# Patient Record
Sex: FEMALE | Race: WHITE | NOT HISPANIC OR LATINO | URBAN - METROPOLITAN AREA
[De-identification: names, ages, dates, MRNs, and addresses within clinical notes are randomized per-mention and may not be internally consistent; named-entity substitution may affect disease eponyms.]

---

## 2017-11-10 ENCOUNTER — IMPORTED ENCOUNTER (OUTPATIENT)
Dept: URBAN - METROPOLITAN AREA CLINIC 9 | Facility: CLINIC | Age: 62
End: 2017-11-10

## 2017-12-12 ENCOUNTER — IMPORTED ENCOUNTER (OUTPATIENT)
Dept: URBAN - METROPOLITAN AREA CLINIC 9 | Facility: CLINIC | Age: 62
End: 2017-12-12

## 2018-06-04 NOTE — PATIENT DISCUSSION
Glasses will not be able to treat all of pt's astigmatism. If pt wants better vision can have Contact lens fit. But would need to see [facility] 1st to have ectropion surgery.

## 2018-07-24 ENCOUNTER — IMPORTED ENCOUNTER (OUTPATIENT)
Dept: URBAN - METROPOLITAN AREA CLINIC 9 | Facility: CLINIC | Age: 63
End: 2018-07-24

## 2018-11-12 ENCOUNTER — IMPORTED ENCOUNTER (OUTPATIENT)
Dept: URBAN - METROPOLITAN AREA CLINIC 9 | Facility: CLINIC | Age: 63
End: 2018-11-12

## 2019-03-19 NOTE — PATIENT DISCUSSION
Recommend Lucentis 0.3 intravitreal Injection. Will do a series of three starting today being 1 of 3.  Will have pt return in 4-5 weeks.

## 2019-03-19 NOTE — PROCEDURE NOTE: CLINICAL
PROCEDURE NOTE: Lucentis 0.3mg OS. Diagnosis: Diabetic Macular Edema. The patient was identified visually and verbally by the  surgeon. The procedure eye was marked with an adhesive arrow sticker. The  risks, benefits, alternatives and possible complications of the procedure  were discussed with the patient and informed consent was obtained. The  patient was positioned in the chair. After numerous topical anesthetic  drops were placed, subconjunctival lidocaine was administered. A speculum  was used to hold the eyelid open. A caliper was used to marked the site of  injection 3.5-4mm from the limbus. Betadine was placed on the site with a  swab and allowed to sit for thirty seconds. A sterile 30 or 31 guage needle with the  medication entered the vitreous cavity and the medication was  administered. The speculum was removed. The eye was copiously rinsed with  saline to remove all betadine, especially from the fornices. Gross vision  was assessed. If the patient wished an antibiotic ointment and eye patch  were applied. The patient was instructed to call immediately if any  significant visual loss, swelling, discharge, or pain occurred Intravitreal injection of Lucentis 0.3mg/0.05 ml was given. Patient tolerated the procedure well. There were no complications. CF vision checked. Post procedure instructions given. Rocio Kelsey

## 2019-04-23 NOTE — PATIENT DISCUSSION
Decreased DME seen on today's exam. Based on today’s exam, diagnostic studies, and/or review of records, the determination was made for treatment, will have pt return for tx only next week.

## 2019-04-23 NOTE — PATIENT DISCUSSION
No conversion to wet seen on today's exam. Discussed in detail re: nature of condition, dry vs wet AMD, AREDS.

## 2019-04-30 NOTE — PATIENT DISCUSSION
Patient here today for treatment only as discussed at last visit.  Decreased DME.  Will continue with injection today.  Patient to return in 1 month for #3/3.

## 2019-04-30 NOTE — PROCEDURE NOTE: CLINICAL
PROCEDURE NOTE: Lucentis 0.5 mg OS. Diagnosis: Diabetic Macular Edema. Anesthesia: Topical. Prep: Betadine Drops and Scrubs. Prior to injection, risks/benefits/alternatives discussed including infection, loss of vision, hemorrhage, cataract, glaucoma, retinal tears or detachment and patient wished to proceed. Informed consent obtained. . Patient was advised the purpose of the treatment was to slow the progression of the disease, and may not improve visual acuity. Betadine prep was performed. Injection site: 3-4 mm from the limbus. Mask worn during procedure. A lid speculum was used. Intravitreal injection of Lucentis 0.5mg/0.05 ml was given. Discarded remaining *. CRA perfusion confirmed. The eye was irrigated with sterile eye rinse solution. The betadine was washed away. Count fingers vision was verified. The patient tolerated the procedure well and there were no complications from the procedure. Post procedure instructions given. Patient given office phone number/answering service number and advised to call immediately should there be an increase in floaters or redness, loss of vision or pain, or should they have any other questions or concerns. Patient was given the standard instruction sheet. Turner Gaxiola

## 2019-05-13 ENCOUNTER — IMPORTED ENCOUNTER (OUTPATIENT)
Dept: URBAN - METROPOLITAN AREA CLINIC 9 | Facility: CLINIC | Age: 64
End: 2019-05-13

## 2019-05-28 NOTE — PROCEDURE NOTE: CLINICAL
PROCEDURE NOTE: Lucentis 0.3mg OS. Diagnosis: Diabetic Macular Edema. The patient was identified visually and verbally by the  surgeon. The procedure eye was marked with an adhesive arrow sticker. The  risks, benefits, alternatives and possible complications of the procedure  were discussed with the patient and informed consent was obtained. The  patient was positioned in the chair. After numerous topical anesthetic  drops were placed, subconjunctival lidocaine was administered. A speculum  was used to hold the eyelid open. A caliper was used to marked the site of  injection 3.5-4mm from the limbus. Betadine was placed on the site with a  swab and allowed to sit for thirty seconds. A sterile 30 or 31 guage needle with the  medication entered the vitreous cavity and the medication was  administered. The speculum was removed. The eye was copiously rinsed with  saline to remove all betadine, especially from the fornices. Gross vision  was assessed. If the patient wished an antibiotic ointment and eye patch  were applied. The patient was instructed to call immediately if any  significant visual loss, swelling, discharge, or pain occurred Intravitreal injection of Lucentis 0.3mg/0.05 ml was given. Patient tolerated the procedure well. There were no complications. CF vision checked. Post procedure instructions given. Boone Rader

## 2019-07-09 NOTE — PROCEDURE NOTE: CLINICAL
PROCEDURE NOTE: Lucentis 0.3mg OS. Diagnosis: Diabetic Macular Edema. The patient was identified visually and verbally by the  surgeon. The procedure eye was marked with an adhesive arrow sticker. The  risks, benefits, alternatives and possible complications of the procedure  were discussed with the patient and informed consent was obtained. The  patient was positioned in the chair. After numerous topical anesthetic  drops were placed, subconjunctival lidocaine was administered. A speculum  was used to hold the eyelid open. A caliper was used to marked the site of  injection 3.5-4mm from the limbus. Betadine was placed on the site with a  swab and allowed to sit for thirty seconds. A sterile 30 or 31 guage needle with the  medication entered the vitreous cavity and the medication was  administered. The speculum was removed. The eye was copiously rinsed with  saline to remove all betadine, especially from the fornices. Gross vision  was assessed. If the patient wished an antibiotic ointment and eye patch  were applied. The patient was instructed to call immediately if any  significant visual loss, swelling, discharge, or pain occurred Intravitreal injection of Lucentis 0.3mg/0.05 ml was given. Patient tolerated the procedure well. There were no complications. CF vision checked. Post procedure instructions given. Turner Gaxiola

## 2019-07-09 NOTE — PATIENT DISCUSSION
Decreased edema, improved. Will treat patient today and have him return in 6 weeks forossible repeat injection.

## 2019-08-08 NOTE — PATIENT DISCUSSION
Recommend right upper lid ptosis repair. Predeterm. Skin/fat to be determined. Skin does not affect MRDs. This will not meet insurance criteria. Skin/fat removal will be cosmetic. (discussed risks and benefits of sx. ..).  OD ONLY skin and fat $1,125.

## 2019-08-20 NOTE — PROCEDURE NOTE: CLINICAL
PROCEDURE NOTE: Lucentis 0.3mg OS. Diagnosis: Diabetic Macular Edema. The patient was identified visually and verbally by the  surgeon. The procedure eye was marked with an adhesive arrow sticker. The  risks, benefits, alternatives and possible complications of the procedure  were discussed with the patient and informed consent was obtained. The  patient was positioned in the chair. After numerous topical anesthetic  drops were placed, subconjunctival lidocaine was administered. A speculum  was used to hold the eyelid open. A caliper was used to marked the site of  injection 3.5-4mm from the limbus. Betadine was placed on the site with a  swab and allowed to sit for thirty seconds. A sterile 30 or 31 guage needle with the  medication entered the vitreous cavity and the medication was  administered. The speculum was removed. The eye was copiously rinsed with  saline to remove all betadine, especially from the fornices. Gross vision  was assessed. If the patient wished an antibiotic ointment and eye patch  were applied. The patient was instructed to call immediately if any  significant visual loss, swelling, discharge, or pain occurred Intravitreal injection of Lucentis 0.3mg/0.05 ml was given. Patient tolerated the procedure well. There were no complications. CF vision checked. Post procedure instructions given. Pearl Cruz

## 2019-08-20 NOTE — PATIENT DISCUSSION
Increased RPE Changes, Worse. No conversion to wet seen on today's exam. Discussed in detail re: nature of condition, dry vs wet AMD, AREDS.

## 2019-08-26 NOTE — PATIENT DISCUSSION
This visual field clearly demonstrated a minimum of 52% loss of upper field of vision OD, with upper lid skin in repose and elevated by taping of the lid to demonstrate potential correction. This field shows that taping the lids significantly improved this patient's superior field of vision by approximately 43%, OD.

## 2019-09-17 NOTE — PROCEDURE NOTE: CLINICAL
PROCEDURE NOTE: Lucentis 0.3mg OS. Diagnosis: Diabetic Macular Edema. The patient was identified visually and verbally by the  surgeon. The procedure eye was marked with an adhesive arrow sticker. The  risks, benefits, alternatives and possible complications of the procedure  were discussed with the patient and informed consent was obtained. The  patient was positioned in the chair. After numerous topical anesthetic  drops were placed, subconjunctival lidocaine was administered. A speculum  was used to hold the eyelid open. A caliper was used to marked the site of  injection 3.5-4mm from the limbus. Betadine was placed on the site with a  swab and allowed to sit for thirty seconds. A sterile 30 or 31 guage needle with the  medication entered the vitreous cavity and the medication was  administered. The speculum was removed. The eye was copiously rinsed with  saline to remove all betadine, especially from the fornices. Gross vision  was assessed. If the patient wished an antibiotic ointment and eye patch  were applied. The patient was instructed to call immediately if any  significant visual loss, swelling, discharge, or pain occurred Intravitreal injection of Lucentis 0.3mg/0.05 ml was given. Patient tolerated the procedure well. There were no complications. CF vision checked. Post procedure instructions given. Attila Holland

## 2019-11-05 NOTE — PROCEDURE NOTE: CLINICAL
PROCEDURE NOTE: Lucentis 0.3mg OS. Diagnosis: Diabetic Macular Edema. The patient was identified visually and verbally by the  surgeon. The procedure eye was marked with an adhesive arrow sticker. The  risks, benefits, alternatives and possible complications of the procedure  were discussed with the patient and informed consent was obtained. The  patient was positioned in the chair. After numerous topical anesthetic  drops were placed, subconjunctival lidocaine was administered. A speculum  was used to hold the eyelid open. A caliper was used to marked the site of  injection 3.5-4mm from the limbus. Betadine was placed on the site with a  swab and allowed to sit for thirty seconds. A sterile 30 or 31 guage needle with the  medication entered the vitreous cavity and the medication was  administered. The speculum was removed. The eye was copiously rinsed with  saline to remove all betadine, especially from the fornices. Gross vision  was assessed. If the patient wished an antibiotic ointment and eye patch  were applied. The patient was instructed to call immediately if any  significant visual loss, swelling, discharge, or pain occurred Intravitreal injection of Lucentis 0.3mg/0.05 ml was given. Patient tolerated the procedure well. There were no complications. CF vision checked. Post procedure instructions given. Nyasia Kitchen

## 2019-11-11 ENCOUNTER — IMPORTED ENCOUNTER (OUTPATIENT)
Dept: URBAN - METROPOLITAN AREA CLINIC 9 | Facility: CLINIC | Age: 64
End: 2019-11-11

## 2019-11-19 NOTE — PROCEDURE NOTE: SURGICAL
<p>PREOPERATIVE DIAGNOSIS: 1. Right upper lid ptosis </p><p>POSTOPERATIVE DIAGNOSIS: Same </p><p>PROCEDURE: 1. Right upper lid ptosis repair. </p><p>SURGEON: Antonella Ugarte MD </p><p>ANESTHESIA: Local MAC. </p><p>ESTIMATED BLOOD LOSS: Minimal &XF;82 cc.</p><p>COMPLICATIONS: None. </p><p>INDICATION: This patient had a droopy righ upper eyelid with good strong levator function indicating this patient had a levator dehiscence. This ptotic lid had decreased the superior and lateral visual field causing the patient decrease visual function. We have discussed the benefits of raising the eyelid to improve visual function in this patient. We have, therefore, decided to proceed with an external ptosis repair of the right upper eyelid. The patient is aware of the risks and benefits and wishes to proceed. </p><p>PROCEDURE: Prior to surgery, a time-out was performed in the pre-operative area. The team, consisting of the surgeon, nurse anesthetist, pre-op nurse and circulating nurse, were gathered around the patient. I confirmed the patient&rsquo;s&nbsp;identity&nbsp;and&nbsp;name,&nbsp;the&nbsp;side&nbsp;and&nbsp;site&nbsp;of&nbsp;the&nbsp;surgery&nbsp;and&nbsp;the&nbsp;type&nbsp;of&nbsp;surgery&nbsp;and&nbsp;procedure&nbsp;to&nbsp;be performed. After confirming this with the patient, in this time-out fashion, I proceeded with the marking of the patient with a blue marking pen, in the areas of surgery to be performed. After informed consent was obtained, the patient was taken to the operating room and placed in the supine position. A drop of Alcaine was placed in each eye and then the patient received monitored anesthesia care. Pre-operative&nbsp;markings&nbsp;corresponding&nbsp;to&nbsp;the&nbsp;patient's limbus on the right upper lid and lid crease were drawn using Methylene Blue marker. Xylocaine 2% with epinephrine was then used to inject into the upper lid and the patient was prepped and draped in the typical sterile fashion as per ophthalmic plastic surgery. A Bovie cautery was used to make an incision in the pre-marked horizontal line of the right upper lid. This incision line was 1 cm and placed just below the lid crease. Dissection was then carried down to the tarsal plate. A rake was used to pull the lid downward exposing tarsal plate with dissection carried out with the Bovie cautery removing tissue off the tarsal plate, the area was then dissected superiorly with removal of orbicularis muscle. The edge of levator tendon was identified as a white tendon and this was a sutured with a double armed 5-0 vicryl suture and then sutured with a lamellar bite down to the tarsal plate. The levator was advanced 3 mm down into the tarsal plate and tied. Care was taken to make sure this suture was not fullthickness; the lid was everted without any signs of the suture on either side. The wound was then closed in the skin with 6-0 plain suture. The patient was awakened and found to have excellent curve and height to the lid. The patient had ophthalmic antibiotic ointment placed on the wounds. The patient tolerated the procedure well and was sent to the recovery room in stable condition. </p>

## 2019-12-10 NOTE — PROCEDURE NOTE: CLINICAL
PROCEDURE NOTE: Lucentis 0.3mg OS. Diagnosis: Diabetic Macular Edema. The patient was identified visually and verbally by the  surgeon. The procedure eye was marked with an adhesive arrow sticker. The  risks, benefits, alternatives and possible complications of the procedure  were discussed with the patient and informed consent was obtained. The  patient was positioned in the chair. After numerous topical anesthetic  drops were placed, subconjunctival lidocaine was administered. A speculum  was used to hold the eyelid open. A caliper was used to marked the site of  injection 3.5-4mm from the limbus. Betadine was placed on the site with a  swab and allowed to sit for thirty seconds. A sterile 30 or 31 guage needle with the  medication entered the vitreous cavity and the medication was  administered. The speculum was removed. The eye was copiously rinsed with  saline to remove all betadine, especially from the fornices. Gross vision  was assessed. If the patient wished an antibiotic ointment and eye patch  were applied. The patient was instructed to call immediately if any  significant visual loss, swelling, discharge, or pain occurred Intravitreal injection of Lucentis 0.3mg/0.05 ml was given. Patient tolerated the procedure well. There were no complications. CF vision checked. Post procedure instructions given. Jf Salazar

## 2020-01-21 NOTE — PROCEDURE NOTE: CLINICAL
PROCEDURE NOTE: Lucentis 0.3mg OS. Diagnosis: Diabetic Macular Edema. The patient was identified visually and verbally by the  surgeon. The procedure eye was marked with an adhesive arrow sticker. The  risks, benefits, alternatives and possible complications of the procedure  were discussed with the patient and informed consent was obtained. The  patient was positioned in the chair. After numerous topical anesthetic  drops were placed, subconjunctival lidocaine was administered. A speculum  was used to hold the eyelid open. A caliper was used to marked the site of  injection 3.5-4mm from the limbus. Betadine was placed on the site with a  swab and allowed to sit for thirty seconds. A sterile 30 or 31 guage needle with the  medication entered the vitreous cavity and the medication was  administered. The speculum was removed. The eye was copiously rinsed with  saline to remove all betadine, especially from the fornices. Gross vision  was assessed. If the patient wished an antibiotic ointment and eye patch  were applied. The patient was instructed to call immediately if any  significant visual loss, swelling, discharge, or pain occurred Intravitreal injection of Lucentis 0.3mg/0.05 ml was given. Patient tolerated the procedure well. There were no complications. CF vision checked. Post procedure instructions given. Cheryl Campbell

## 2020-07-07 NOTE — PROCEDURE NOTE: CLINICAL
PROCEDURE NOTE: Lucentis 0.3mg OS. Diagnosis: Diabetic Macular Edema. The patient was identified visually and verbally by the  surgeon. The procedure eye was marked with an adhesive arrow sticker. The  risks, benefits, alternatives and possible complications of the procedure  were discussed with the patient and informed consent was obtained. The  patient was positioned in the chair. After numerous topical anesthetic  drops were placed, subconjunctival lidocaine was administered. A speculum  was used to hold the eyelid open. A caliper was used to marked the site of  injection 3.5-4mm from the limbus. Betadine was placed on the site with a  swab and allowed to sit for thirty seconds. A sterile 30 or 31 guage needle with the  medication entered the vitreous cavity and the medication was  administered. The speculum was removed. The eye was copiously rinsed with  saline to remove all betadine, especially from the fornices. Gross vision  was assessed. If the patient wished an antibiotic ointment and eye patch  were applied. The patient was instructed to call immediately if any  significant visual loss, swelling, discharge, or pain occurred Intravitreal injection of Lucentis 0.3mg/0.05 ml was given. Patient tolerated the procedure well. There were no complications. CF vision checked. Post procedure instructions given. Alejandra Beyer

## 2020-08-04 NOTE — PROCEDURE NOTE: CLINICAL
PROCEDURE NOTE: Lucentis 0.3mg OS. Diagnosis: Diabetic Macular Edema. The patient was identified visually and verbally by the  surgeon. The procedure eye was marked with an adhesive arrow sticker. The  risks, benefits, alternatives and possible complications of the procedure  were discussed with the patient and informed consent was obtained. The  patient was positioned in the chair. After numerous topical anesthetic  drops were placed, subconjunctival lidocaine was administered. A speculum  was used to hold the eyelid open. A caliper was used to marked the site of  injection 3.5-4mm from the limbus. Betadine was placed on the site with a  swab and allowed to sit for thirty seconds. A sterile 30 or 31 guage needle with the  medication entered the vitreous cavity and the medication was  administered. The speculum was removed. The eye was copiously rinsed with  saline to remove all betadine, especially from the fornices. Gross vision  was assessed. If the patient wished an antibiotic ointment and eye patch  were applied. The patient was instructed to call immediately if any  significant visual loss, swelling, discharge, or pain occurred Intravitreal injection of Lucentis 0.3mg/0.05 ml was given. Patient tolerated the procedure well. There were no complications. CF vision checked. Post procedure instructions given. Mali Serrano

## 2020-09-08 NOTE — PROCEDURE NOTE: CLINICAL
PROCEDURE NOTE: Eylea Sample OS. Diagnosis: Diabetic Macular Edema. Anesthesia: Topical. Prep: Betadine Drops and Betadine Scrub. Prior to injection, risks/benefits/alternatives discussed including corneal abrasion, infection, loss of vision, hemorrhage, cataract, glaucoma, retinal tears or detachment. A written consent is on file, and the need for today’s injection was discussed and the patient is understanding and wishes to proceed. The entire vial of Eylea was drawn up into a syringe. The opened vial, remaining drug, and filtered needle were disposed of in a certified biohazard container. Betadine prep was performed. Topical anesthesia was induced with Alcaine. 4% lidocaine pledge. A lid speculum was used. A short 30g needle on a 1cc syringe was used with product that that had previously been prepared under sterile conditions. Injection site: 3-4 mm from the limbus. The used syringe/needle was transferred to a biohazard container. Lid speculum removed. Mask worn during procedure. Patient tolerated procedure well. Count fingers vision was verified. There were no complications. Patient was given the standard instruction sheet. Patient given office phone number/answering service number and advised to call immediately should there be loss of vision or pain, or should they have any other questions or concerns. Sudha Dominguez

## 2020-11-03 NOTE — PROCEDURE NOTE: CLINICAL
PROCEDURE NOTE: Eylea Prefilled Syringe 2mg OS. Diagnosis: Diabetic Macular Edema. Prep: Betadine Drops and Betadine Scrub. Prior to injection, risks/benefits/alternatives discussed including corneal abrasion, infection, loss of vision, hemorrhage, cataract, glaucoma, retinal tears or detachment. A written consent is on file, and the need for today's injection was discussed and the patient is understanding and wishes to proceed. A 30G needle was placed on an Eylea 2mg/0.05ml Pre-filled Syringe. Betadine prep was performed. Topical anesthesia was induced with Alcaine. 4% lidocaine pledge. A lid speculum was used. An intravitreal injection of Eylea was given. Injection site: 3-4 mm from the limbus. The used syringe/needle was transferred to a biohazard container. Lid speculum removed. Mask worn during procedure. Patient tolerated procedure well. Count fingers vision was verified. There were no complications. Patient was given the standard instruction sheet. Alejandra Beyer

## 2020-11-03 NOTE — PATIENT DISCUSSION
Stable, Discussed with the patient the importance of good control of their blood sugar, blood pressure, cholesterol, diet, exercise, weight, and medication usage under the guidance of their diabetic doctor to prevent/halt diabetic retinopathy.

## 2020-11-09 ENCOUNTER — IMPORTED ENCOUNTER (OUTPATIENT)
Dept: URBAN - METROPOLITAN AREA CLINIC 9 | Facility: CLINIC | Age: 65
End: 2020-11-09

## 2020-12-08 NOTE — PROCEDURE NOTE: CLINICAL
PROCEDURE NOTE: Eylea Prefilled Syringe 2mg OS. Diagnosis: Diabetic Macular Edema. Prep: Betadine Drops and Betadine Scrub. Prior to injection, risks/benefits/alternatives discussed including corneal abrasion, infection, loss of vision, hemorrhage, cataract, glaucoma, retinal tears or detachment. A written consent is on file, and the need for today's injection was discussed and the patient is understanding and wishes to proceed. A 30G needle was placed on an Eylea 2mg/0.05ml Pre-filled Syringe. Betadine prep was performed. Topical anesthesia was induced with Alcaine. 4% lidocaine pledge. A lid speculum was used. An intravitreal injection of Eylea was given. Injection site: 3-4 mm from the limbus. The used syringe/needle was transferred to a biohazard container. Lid speculum removed. Mask worn during procedure. Patient tolerated procedure well. Count fingers vision was verified. There were no complications. Patient was given the standard instruction sheet. Erendira Shahid

## 2021-03-02 NOTE — PROCEDURE NOTE: CLINICAL
PROCEDURE NOTE: Eylea Prefilled Syringe 2mg OS. Diagnosis: Diabetic Macular Edema. Prep: Betadine Drops and Betadine Scrub. Prior to injection, risks/benefits/alternatives discussed including corneal abrasion, infection, loss of vision, hemorrhage, cataract, glaucoma, retinal tears or detachment. A written consent is on file, and the need for today's injection was discussed and the patient is understanding and wishes to proceed. A 30G needle was placed on an Eylea 2mg/0.05ml Pre-filled Syringe. Betadine prep was performed. Topical anesthesia was induced with Alcaine. 4% lidocaine pledge. A lid speculum was used. An intravitreal injection of Eylea was given. Injection site: 3-4 mm from the limbus. The used syringe/needle was transferred to a biohazard container. Lid speculum removed. Mask worn during procedure. Patient tolerated procedure well. Count fingers vision was verified. There were no complications. Patient was given the standard instruction sheet. Nyasia Kitchen

## 2021-03-30 NOTE — PROCEDURE NOTE: CLINICAL
PROCEDURE NOTE: Eylea Prefilled Syringe 2mg OS. Diagnosis: Diabetic Macular Edema. Prep: Betadine Drops and Betadine Scrub. Prior to injection, risks/benefits/alternatives discussed including corneal abrasion, infection, loss of vision, hemorrhage, cataract, glaucoma, retinal tears or detachment. A written consent is on file, and the need for today's injection was discussed and the patient is understanding and wishes to proceed. A 30G needle was placed on an Eylea 2mg/0.05ml Pre-filled Syringe. Betadine prep was performed. Topical anesthesia was induced with Alcaine. 4% lidocaine pledge. A lid speculum was used. An intravitreal injection of Eylea was given. Injection site: 3-4 mm from the limbus. The used syringe/needle was transferred to a biohazard container. Lid speculum removed. Mask worn during procedure. Patient tolerated procedure well. Count fingers vision was verified. There were no complications. Patient was given the standard instruction sheet. Jf Salazar

## 2021-04-27 NOTE — PROCEDURE NOTE: CLINICAL
PROCEDURE NOTE: Eylea Prefilled Syringe 2mg OS. Diagnosis: Diabetic Macular Edema. Prep: Betadine Drops and Betadine Scrub. Prior to injection, risks/benefits/alternatives discussed including corneal abrasion, infection, loss of vision, hemorrhage, cataract, glaucoma, retinal tears or detachment. A written consent is on file, and the need for today's injection was discussed and the patient is understanding and wishes to proceed. A 30G needle was placed on an Eylea 2mg/0.05ml Pre-filled Syringe. Betadine prep was performed. Topical anesthesia was induced with Alcaine. 4% lidocaine pledge. A lid speculum was used. An intravitreal injection of Eylea was given. Injection site: 3-4 mm from the limbus. The used syringe/needle was transferred to a biohazard container. Lid speculum removed. Mask worn during procedure. Patient tolerated procedure well. Count fingers vision was verified. There were no complications. Patient was given the standard instruction sheet. Rolanda Al

## 2021-05-05 ENCOUNTER — PREPPED CHART (OUTPATIENT)
Dept: URBAN - METROPOLITAN AREA CLINIC 17 | Facility: CLINIC | Age: 66
End: 2021-05-05

## 2021-05-05 ENCOUNTER — IMPORTED ENCOUNTER (OUTPATIENT)
Dept: URBAN - METROPOLITAN AREA CLINIC 9 | Facility: CLINIC | Age: 66
End: 2021-05-05

## 2021-05-05 PROBLEM — H04.123: Noted: 2021-05-05

## 2021-05-05 PROBLEM — H52.4: Noted: 2021-05-05

## 2021-05-25 NOTE — PROCEDURE NOTE: CLINICAL
PROCEDURE NOTE: Eylea Prefilled Syringe 2mg OS. Diagnosis: Diabetic Macular Edema. Prep: Betadine Drops and Betadine Scrub. Prior to injection, risks/benefits/alternatives discussed including corneal abrasion, infection, loss of vision, hemorrhage, cataract, glaucoma, retinal tears or detachment. A written consent is on file, and the need for today's injection was discussed and the patient is understanding and wishes to proceed. A 30G needle was placed on an Eylea 2mg/0.05ml Pre-filled Syringe. Betadine prep was performed. Topical anesthesia was induced with Alcaine. 4% lidocaine pledge. A lid speculum was used. An intravitreal injection of Eylea was given. Injection site: 3-4 mm from the limbus. The used syringe/needle was transferred to a biohazard container. Lid speculum removed. Mask worn during procedure. Patient tolerated procedure well. Count fingers vision was verified. There were no complications. Patient was given the standard instruction sheet. Cheryl Campbell

## 2021-08-19 NOTE — PATIENT DISCUSSION
Discussed the importance of blood pressure control in the prevention of ocular complications. negative

## 2021-08-19 NOTE — PATIENT DISCUSSION
Symptoms of retinal detachment and endophthalmitis following intravitreal injection discussed; patient advised to call immediately if symptoms ensue. yes...

## 2021-09-20 NOTE — PATIENT DISCUSSION
PROGRESS NOTE      Elaina Baires Patient Status:  Inpatient    1956 MRN [id number]   Location [address] SURG Attending Joan Wilcox MD   Hosp Day # 6 PCP Lakesha Dominguez MD       Subjective    Constipated  Became very tearful, feeling overwhelmed by the chronicity of her current medical condition/illness involving the shoulder    Objective      Intake/Output Summary (Last 24 hours) at 2021 1201  Last data filed at 2021 1900  Gross per 24 hour   Intake 400 ml   Output --   Net 400 ml        Last Recorded Vitals  Blood pressure 134/75, pulse 64, temperature 98.6 °F (37 °C), temperature source Oral, resp. rate 16, height 5' 2.5\" (1.588 m), weight 74 kg (163 lb 2.3 oz), SpO2 99 %.  Body mass index is 29.36 kg/m².    Physical Exam  Vitals reviewed.   HENT:      Head: Normocephalic and atraumatic.   Eyes:      Pupils: Pupils are equal, round, and reactive to light.   Neck:      Trachea: No tracheal deviation.   Cardiovascular:      Rate and Rhythm: Normal rate and regular rhythm.   Pulmonary:      Effort: Pulmonary effort is normal. No respiratory distress.      Breath sounds: Normal breath sounds. No stridor. No wheezing or rales.   Chest:      Chest wall: No tenderness.   Abdominal:      General: There is no distension.      Palpations: Abdomen is soft.      Tenderness: There is no abdominal tenderness. There is no guarding or rebound.   Musculoskeletal:         General: No tenderness or deformity.      Cervical back: Neck supple.      Comments: Right shoulder in cryo pack  Distally neurovascularly intact in rue    Skin:     General: Skin is warm and dry.      Findings: No erythema or rash.   Neurological:      Mental Status: She is alert and oriented to person, place, and time.   Psychiatric:         Mood and Affect: Mood and affect normal.         Cognition and Memory: Memory normal.         Judgment: Judgment normal.          Current Facility-Administered Medications  (part of systemic allergic issues.  Seeing Allergist at this time).   Medication Dose Route Frequency Provider Last Rate Last Admin   • bisacodyl (DULCOLAX) suppository 10 mg  10 mg Rectal Once Joan Wilcox MD       • diphenhydrAMINE (BENADRYL) capsule 25 mg  25 mg Oral Q6H PRN Joan Wilcox MD   25 mg at 09/20/21 0521   • HYDROcodone-acetaminophen (NORCO) 5-325 MG per tablet 2 tablet  2 tablet Oral Q4H PRN Aaron Merchant DO   2 tablet at 09/20/21 0944   • HYDROmorphone (DILAUDID) injection 0.6 mg  0.6 mg Intravenous Q6H PRN Joan Wilcox MD   0.6 mg at 09/20/21 0356   • VANCOMYCIN - PHARMACIST MONITORED Misc   Does not apply See Admin Instructions Chetan Yang MD       • acetaminophen (TYLENOL) tablet 500 mg  500 mg Oral Q6H PRN Chetan Yang MD       • albuterol inhaler 2 puff  2 puff Inhalation Q6H Resp Chetan Yang MD   2 puff at 09/20/21 1001   • amLODIPine (NORVASC) tablet 10 mg  10 mg Oral Daily Chetan Yang MD   10 mg at 09/20/21 0930   • atorvastatin (LIPITOR) tablet 10 mg  10 mg Oral Nightly Chetan Yang MD   10 mg at 09/19/21 2122   • lisinopril (ZESTRIL) tablet 40 mg  40 mg Oral Daily Chetan Yang MD   40 mg at 09/20/21 0930   • enoxaparin (LOVENOX) injection 40 mg  40 mg Subcutaneous Q Evening Chetan Yang MD   40 mg at 09/19/21 1800   • famotidine (PEPCID) tablet 20 mg  20 mg Oral QHS Chetan Yang MD   20 mg at 09/19/21 2122   • aluminum-magnesium hydroxide-simethicone (MAALOX) 200-200-20 MG/5ML suspension 20 mL  20 mL Oral Q6H PRN Chetan Yang MD       • melatonin tablet 3 mg  3 mg Oral QHS PRN Chetan Yang MD   3 mg at 09/17/21 2108   • ondansetron (ZOFRAN) injection 4 mg  4 mg Intravenous Q8H PRN Chetan Yang MD   4 mg at 09/18/21 2248   • insulin lispro (ADMELOG,HumaLOG) - Correction Dose   Subcutaneous TID WC Chetan Yang MD   2 Units at 09/18/21 0800   • insulin lispro (ADMELOG,HumaLOG) - Correction Dose   Subcutaneous Nightly Chetan Yang MD       • dextrose 50 % injection 25 g  25 g Intravenous PRN Chetan CONSTANTINO  MD Trey       • dextrose 50 % injection 12.5 g  12.5 g Intravenous PRN Chetan Yang MD       • glucagon (GLUCAGEN) injection 1 mg  1 mg Intramuscular PRN Chetan Yang MD       • dextrose (GLUTOSE) 40 % gel 15 g  15 g Oral PRN Chetan Yang MD       • dextrose (GLUTOSE) 40 % gel 30 g  30 g Oral PRN Chetan Yang MD       • vancomycin (VANCOCIN) 1,000 mg in sodium chloride 0.9 % 250 mL IVPB  1,000 mg Intravenous 2 times per day Chetan Yang .7 mL/hr at 09/20/21 0931 1,000 mg at 09/20/21 0931   • acetaminophen (TYLENOL) tablet 1,000 mg  1,000 mg Oral Once Mariam Douglas MD             Labs     Recent Results (from the past 24 hour(s))   GLUCOSE, BEDSIDE - POINT OF CARE    Collection Time: 09/19/21  3:38 PM   Result Value Ref Range    GLUCOSE, BEDSIDE - POINT OF CARE 157 (H) 70 - 99 mg/dL   GLUCOSE, BEDSIDE - POINT OF CARE    Collection Time: 09/19/21  8:44 PM   Result Value Ref Range    GLUCOSE, BEDSIDE - POINT OF CARE 195 (H) 70 - 99 mg/dL   Creatinine    Collection Time: 09/20/21  5:16 AM   Result Value Ref Range    Creatinine 0.80 0.51 - 0.95 mg/dL    Glomerular Filtration Rate 90 >=60   GLUCOSE, BEDSIDE - POINT OF CARE    Collection Time: 09/20/21  5:23 AM   Result Value Ref Range    GLUCOSE, BEDSIDE - POINT OF CARE 133 (H) 70 - 99 mg/dL   GLUCOSE, BEDSIDE - POINT OF CARE    Collection Time: 09/20/21 11:16 AM   Result Value Ref Range    GLUCOSE, BEDSIDE - POINT OF CARE 217 (H) 70 - 99 mg/dL        Imaging  XR CHEST PA AND LATERAL 2 VIEWS    Result Date: 9/14/2021  Narrative: EXAM: Chest PA lateral INDICATION arm pain after recent PICC line removal, dyspnea Comparing x-ray from 05/02/2021     Impression: FINDINGS with IMPRESSION:  No consolidation or pleural effusion.  Slight scarring at left base.  Heart size and pulmonary vessel caliber normal.  Aortic atherosclerotic calcification.  Surgical anchors in the right humeral head.  Surgical staples in the upper abdomen. Electronically Signed by:  ASHWIN QUINTEROS M.D. Signed on: 9/14/2021 6:04 AM     XR SHOULDER 3 VIEWS RIGHT    Result Date: 9/14/2021  Narrative: EXAM: XR SHOULDER 3 VIEWS RIGHT CLINICAL INDICATION: 64-year-old female with worsening right shoulder pain. COMPARISON: 07/21/2021 FINDINGS:  There are postsurgical changes in the proximal right humerus with two orthopedic anchors.  Lucency surrounding the orthopedic anchors is similar to prior exam as well as the irregularity in the superolateral humeral head and erosive change in the medial inferior humerus.  No fracture or dislocation is seen.  Joint spaces are preserved.  The visualized soft tissues are grossly unremarkable.     Impression:  No significant change in the appearance of the right shoulder with stable lucency/erosive changes throughout the right humeral head. Radiographically stable lucencies/erosive change may sequela of osteomyelitis/septic arthritis which was seen on prior July 2021 at admission.  Acute on chronic pathology cannot be excluded radiographically. FOR PHYSICIAN USE ONLY - Please note that this report was generated using voice recognition software.  If you require clarification or feel that there has been an error in this report please contact me through [employer]..  Thank you very much for allowing me to participate in the care of your patient. Electronically Signed by: LISA DAMON M.D. Signed on: 9/14/2021 9:35 AM     US VAS UPPER EXTREMITY VENOUS DUPLEX RIGHT    Result Date: 9/14/2021  Narrative: EXAM: US VASC UPPER EXTREMITY VENOUS DUPLEX RIGHT CLINICAL INDICATION: 64-year-old female with right arm pain and swelling, recent PICC line. COMPARISON: 10/22/2020. FINDINGS: The right internal jugular, visualized patent, axillary, basilic, cephalic, and brachial veins demonstrate compressibility with spontaneous color flow and normal respiratory variation which is consistent with patency. Left internal jugular vein was imaged for comparison is patent. No fluid  collections are identified.     Impression: No evidence of deep vein thrombosis in the right upper extremity. A preliminary report was provided by the on-call teleradiology service. The FOR PHYSICIAN USE ONLY - Please note that this report was generated using voice recognition software.  If you require clarification or feel that there has been an error in this report please contact me through [employer].  Thank you very much for allowing me to participate in the care of your patient. Electronically Signed by: LISA DAMON M.D. Signed on: 9/14/2021 8:03 AM          Assessment & Plan  Subacute Osteomyelitis of Right Shoulder (Cms/Hcc)   Staphylococcal Arthritis of Right Shoulder (Cms/Hcc)   Septic Arthritis of Shoulder, Right (Cms/Hcc)   Adhesive Capsulitis of Right Shoulder      Abscess of axillary region MSSA   Hx Rotator Cuff Tear s/p rt shoulder arthroplasty    Ortho ff s/p debridement on 9/17  Will eventually need a replacement once infection resolved   ID on consult.  PICC line today and plan for home with 6 weeks of IV vancomycin. Premedicate with benadryl, ctm closely   On pain management  Physical Therapy eval       Hyponatremia   Resolved        Type 2 Diabetes Mellitus With Hyperglycemia (Cms/Hcc)   On Accuchecks and ISS      Anemia of Chronic Disease   No signs of bleeding      History of Cva (Cerebrovascular Accident) Without Residual Deficits  On Neurochecks      Copd (Chronic Obstructive Pulmonary Disease) (Cms/Hcc)  Inhalers prn      Debility  Djd (Degenerative Joint Disease)  On Fall precautions          DVT Prophylaxis  Current Active Medications for DVT Prophylaxis (From admission, onward)         Stop     enoxaparin (LOVENOX) injection 40 mg  40 mg,   Subcutaneous,   EVERY EVENING         --                  Joan Wilcox MD  9/20/2021      This note was generated in part using \"Dragon\" voice recognition technology. The report was reviewed by this physician, but still may have unintentional  errors due to inherent limitations of voice recognition technology.

## 2021-10-05 NOTE — PROCEDURE NOTE: CLINICAL
PROCEDURE NOTE: Eylea Prefilled Syringe 2mg OS. Diagnosis: Diabetic Macular Edema. Prep: Betadine Drops and Betadine Scrub. Prior to injection, risks/benefits/alternatives discussed including corneal abrasion, infection, loss of vision, hemorrhage, cataract, glaucoma, retinal tears or detachment. A written consent is on file, and the need for today's injection was discussed and the patient is understanding and wishes to proceed. A 30G needle was placed on an Eylea 2mg/0.05ml Pre-filled Syringe. Betadine prep was performed. Topical anesthesia was induced with Alcaine. 4% lidocaine pledge. A lid speculum was used. An intravitreal injection of Eylea was given. Injection site: 3-4 mm from the limbus. The used syringe/needle was transferred to a biohazard container. Lid speculum removed. Mask worn during procedure. Patient tolerated procedure well. Count fingers vision was verified. There were no complications. Patient was given the standard instruction sheet. Jonathan Cotton

## 2021-10-16 ASSESSMENT — VISUAL ACUITY
OS_CC: 20/20 SN
OS_SC: 20/20 SN
OS_SC: 20/20 -2 SN
OS_CC: 20/20 SN
OD_CC: 20/20 - SN
OD_SC: 20/20 SN
OS_SC: 20/20 - SN
OD_SC: 20/25 - SN
OD_SC: 20/20 -2 SN
OS_CC: 20/20 SN
OD_CC: 20/20 SN
OD_SC: 20/20 -2 SN
OS_SC: 20/20 - SN
OD_SC: 20/25 SN
OD_SC: 20/25 + SN
OD_CC: 20/20 SN
OS_SC: 20/20 SN
OD_SC: 20/20 SN
OS_SC: 20/25 SN
OD_CC: 20/20 SN
OS_CC: 20/20 SN
OS_SC: 20/20 - SN

## 2021-10-16 ASSESSMENT — TONOMETRY
OD_IOP_MMHG: 20
OS_IOP_MMHG: 16
OS_IOP_MMHG: 17
OS_IOP_MMHG: 18
OS_IOP_MMHG: 18
OD_IOP_MMHG: 17
OD_IOP_MMHG: 18
OS_IOP_MMHG: 19
OD_IOP_MMHG: 19
OD_IOP_MMHG: 17
OD_IOP_MMHG: 20
OD_IOP_MMHG: 20
OD_IOP_MMHG: 19
OS_IOP_MMHG: 16

## 2021-11-04 ENCOUNTER — ESTABLISHED PATIENT (OUTPATIENT)
Dept: URBAN - METROPOLITAN AREA CLINIC 12 | Facility: CLINIC | Age: 66
End: 2021-11-04

## 2021-11-04 DIAGNOSIS — H25.13: ICD-10-CM

## 2021-11-04 DIAGNOSIS — H52.4: ICD-10-CM

## 2021-11-04 PROCEDURE — 99199PERE PHYSICIANS EYECARE PLAN EXAM AND REFRACT EST PT

## 2021-11-04 PROCEDURE — 92015 DETERMINE REFRACTIVE STATE: CPT

## 2021-11-04 ASSESSMENT — TONOMETRY
OD_IOP_MMHG: 12
OS_IOP_MMHG: 12

## 2021-11-04 ASSESSMENT — VISUAL ACUITY
OD_SC: 20/25-2
OS_SC: 20/30+1

## 2022-01-21 NOTE — PROCEDURE NOTE: CLINICAL
PROCEDURE NOTE: Eylea Prefilled Syringe 2mg OS. Diagnosis: Diabetic Macular Edema. Prep: Betadine Drops and Betadine Scrub. Prior to injection, risks/benefits/alternatives discussed including corneal abrasion, infection, loss of vision, hemorrhage, cataract, glaucoma, retinal tears or detachment. A written consent is on file, and the need for today's injection was discussed and the patient is understanding and wishes to proceed. A 30G needle was placed on an Eylea 2mg/0.05ml Pre-filled Syringe. Betadine prep was performed. Topical anesthesia was induced with Alcaine. 4% lidocaine pledge. A lid speculum was used. An intravitreal injection of Eylea was given. Injection site: 3-4 mm from the limbus. The used syringe/needle was transferred to a biohazard container. Lid speculum removed. Mask worn during procedure. Patient tolerated procedure well. Count fingers vision was verified. There were no complications. Patient was given the standard instruction sheet. Loretta Yip
rib pain/injury
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
## 2022-01-25 NOTE — PROCEDURE NOTE: CLINICAL
PROCEDURE NOTE: Eylea Prefilled Syringe 2mg OS. Diagnosis: Diabetic Macular Edema. Prep: Betadine Drops and Betadine Scrub. Prior to injection, risks/benefits/alternatives discussed including corneal abrasion, infection, loss of vision, hemorrhage, cataract, glaucoma, retinal tears or detachment. A written consent is on file, and the need for today's injection was discussed and the patient is understanding and wishes to proceed. A 30G needle was placed on an Eylea 2mg/0.05ml Pre-filled Syringe. Betadine prep was performed. Topical anesthesia was induced with Alcaine. 4% lidocaine pledge. A lid speculum was used. An intravitreal injection of Eylea was given. Injection site: 3-4 mm from the limbus. The used syringe/needle was transferred to a biohazard container. Lid speculum removed. Mask worn during procedure. Patient tolerated procedure well. Count fingers vision was verified. There were no complications. Patient was given the standard instruction sheet. Pam Klein

## 2022-03-22 NOTE — PROCEDURE NOTE: CLINICAL
PROCEDURE NOTE: Eylea Prefilled Syringe 2mg OS. Diagnosis: Diabetic Macular Edema. Prep: Betadine Drops and Betadine Scrub. Prior to injection, risks/benefits/alternatives discussed including corneal abrasion, infection, loss of vision, hemorrhage, cataract, glaucoma, retinal tears or detachment. A written consent is on file, and the need for today's injection was discussed and the patient is understanding and wishes to proceed. A 30G needle was placed on an Eylea 2mg/0.05ml Pre-filled Syringe. Betadine prep was performed. Topical anesthesia was induced with Alcaine. 4% lidocaine pledge. A lid speculum was used. An intravitreal injection of Eylea was given. Injection site: 3-4 mm from the limbus. The used syringe/needle was transferred to a biohazard container. Lid speculum removed. Mask worn during procedure. Patient tolerated procedure well. Count fingers vision was verified. There were no complications. Patient was given the standard instruction sheet. Gumaro Polk

## 2022-05-17 NOTE — PROCEDURE NOTE: CLINICAL
PROCEDURE NOTE: Eylea Prefilled Syringe 2mg OS. Diagnosis: Diabetic Macular Edema. Prep: Betadine Drops and Betadine Scrub. Prior to injection, risks/benefits/alternatives discussed including corneal abrasion, infection, loss of vision, hemorrhage, cataract, glaucoma, retinal tears or detachment. A written consent is on file, and the need for today's injection was discussed and the patient is understanding and wishes to proceed. A 30G needle was placed on an Eylea 2mg/0.05ml Pre-filled Syringe. Betadine prep was performed. Topical anesthesia was induced with Alcaine. 4% lidocaine pledge. A lid speculum was used. An intravitreal injection of Eylea was given. Injection site: 3-4 mm from the limbus. The used syringe/needle was transferred to a biohazard container. Lid speculum removed. Mask worn during procedure. Patient tolerated procedure well. Count fingers vision was verified. There were no complications. Patient was given the standard instruction sheet. Rocio Pollen

## 2022-05-17 NOTE — PATIENT DISCUSSION
An examination that was significantly and separately identifiable from the procedure performed today was also completed for Ocular Pain.

## 2022-06-24 RX ORDER — ATORVASTATIN CALCIUM 40 MG/1
TABLET, FILM COATED ORAL
COMMUNITY
End: 2022-09-16 | Stop reason: SDUPTHER

## 2022-06-24 RX ORDER — CITALOPRAM 40 MG/1
TABLET ORAL
COMMUNITY
End: 2022-09-16 | Stop reason: SDUPTHER

## 2022-06-24 RX ORDER — BUPROPION HYDROCHLORIDE 300 MG/1
TABLET ORAL
COMMUNITY
End: 2022-09-16 | Stop reason: SDUPTHER

## 2022-07-12 NOTE — PROCEDURE NOTE: CLINICAL
PROCEDURE NOTE: Eylea Prefilled Syringe 2mg OS. Diagnosis: Diabetic Macular Edema. Prep: Betadine Drops and Betadine Scrub. Prior to injection, risks/benefits/alternatives discussed including corneal abrasion, infection, loss of vision, hemorrhage, cataract, glaucoma, retinal tears or detachment. A written consent is on file, and the need for today's injection was discussed and the patient is understanding and wishes to proceed. A 30G needle was placed on an Eylea 2mg/0.05ml Pre-filled Syringe. Betadine prep was performed. Topical anesthesia was induced with Alcaine. 4% lidocaine pledge. A lid speculum was used. An intravitreal injection of Eylea was given. Injection site: 3-4 mm from the limbus. The used syringe/needle was transferred to a biohazard container. Lid speculum removed. Mask worn during procedure. Patient tolerated procedure well. Count fingers vision was verified. There were no complications. Patient was given the standard instruction sheet. Boone Rader

## 2022-08-16 NOTE — PROCEDURE NOTE: CLINICAL
PROCEDURE NOTE: Vabysmo Injection Sample OS. Diagnosis: Diabetic Macular Edema. Anesthesia: Topical. Prep: Betadine Drops and Betadine Scrub. Prior to injection, risks/benefits/alternatives discussed including corneal abrasion, infection, loss of vision, hemorrhage, cataract, glaucoma, retinal tears or detachment. A written consent is on file, and the need for today's injection was discussed and the patient is understanding and wishes to proceed. A 30G needle was placed on a Vabysmo 6mg/0.05ml Pre-filled Syringe. Betadine prep was performed. Topical anesthesia was induced with Alcaine. 4% lidocaine pledge. A lid speculum was used. An intravitreal injection of Vabysmo was given. Injection site: 3-4 mm from the limbus. The used syringe/needle was transferred to a biohazard container. Mask worn during procedure. Lid speculum removed. Patient tolerated procedure well. Count fingers vision was verified. There were no complications. Patient was given the standard instruction sheet. Nancy Chappell

## 2022-08-16 NOTE — PATIENT DISCUSSION
Recommended [facility] Sample injection today. The injection was given and tolerated well by patient. Post-injection instructions were reviewed and understood by the patient.

## 2022-09-16 PROBLEM — R03.0 ELEVATED BP WITHOUT DIAGNOSIS OF HYPERTENSION: Status: ACTIVE | Noted: 2022-09-16

## 2022-09-16 PROBLEM — K57.30 SIGMOID DIVERTICULOSIS: Status: ACTIVE | Noted: 2022-09-16

## 2022-09-16 PROBLEM — G47.30 MILD SLEEP APNEA: Status: ACTIVE | Noted: 2022-09-16

## 2022-09-16 PROBLEM — F32.A DEPRESSION: Status: ACTIVE | Noted: 2022-09-16

## 2022-09-16 PROBLEM — N95.9 PERIMENOPAUSAL DISORDER: Status: ACTIVE | Noted: 2022-09-16

## 2022-09-16 PROBLEM — M19.071 PRIMARY OSTEOARTHRITIS OF RIGHT FOOT: Status: ACTIVE | Noted: 2022-09-16

## 2022-09-16 PROBLEM — D64.9 ANEMIA: Status: ACTIVE | Noted: 2022-09-16

## 2022-09-16 PROBLEM — R73.9 HYPERGLYCEMIA: Status: ACTIVE | Noted: 2022-09-16

## 2022-09-16 PROBLEM — E78.2 MIXED DYSLIPIDEMIA: Status: ACTIVE | Noted: 2022-09-16

## 2022-09-16 PROBLEM — K21.9 GASTROESOPHAGEAL REFLUX DISEASE WITHOUT ESOPHAGITIS: Status: ACTIVE | Noted: 2022-09-16

## 2022-10-10 PROBLEM — Z79.899 HIGH RISK MEDICATION USE: Status: ACTIVE | Noted: 2022-10-10

## 2022-10-10 PROBLEM — S63.659A: Status: ACTIVE | Noted: 2022-10-10

## 2022-10-31 NOTE — PATIENT DISCUSSION
Recommended [facility] Sample injection today. The injection was given and tolerated well by patient. Post-injection instructions were reviewed and understood by the patient.

## 2022-10-31 NOTE — PATIENT DISCUSSION
AMD remains persistent but without progression. Continue with Amsler grid and AREDS 2. Avoid direct or indirect smoking. The possibility of progression was discussed. No IRF/SRF.

## 2022-10-31 NOTE — PROCEDURE NOTE: CLINICAL
PROCEDURE NOTE: Vabysmo Injection Sample OS. Diagnosis: Diabetic Macular Edema. Anesthesia: Topical. Prep: Betadine Drops and Betadine Scrub. Prior to injection, risks/benefits/alternatives discussed including corneal abrasion, infection, loss of vision, hemorrhage, cataract, glaucoma, retinal tears or detachment. A written consent is on file, and the need for today's injection was discussed and the patient is understanding and wishes to proceed. A 30G needle was placed on a Vabysmo 6mg/0.05ml Pre-filled Syringe. Betadine prep was performed. Topical anesthesia was induced with Alcaine. 4% lidocaine pledge. A lid speculum was used. An intravitreal injection of Vabysmo was given. Injection site: 3-4 mm from the limbus. The used syringe/needle was transferred to a biohazard container. Lid speculum removed. Mask worn during procedure. Patient tolerated procedure well. Count fingers vision was verified. There were no complications. Patient was given the standard instruction sheet. Neyda Jennings

## 2022-11-27 NOTE — PATIENT DISCUSSION
Advised to call immediately if eye pain or loss of vision.
Amsler grid at home. MVI/AREDS discussed. Patient to call if any changes in vision or grid card.
Call immediately to report any decreased vision or visual distortion.
Cells are at the middle or end stage of life and pt may need DSAEK in future.
Chilled artificial tears prn.
Cool compresses.
Despite some risk factors, the patient does not demonstrate definitive evidence of glaucoma at this time.
Discussed Restasis or punctal occlusion.
Discussed condition and exacerbating conditions/situations (e.g., dry/arid environments, overhead fans, air conditioners, side effect of medications).
Discussed the importance of blood sugar control in the prevention of ocular complications.
Glasses Prescription given to patient.
Increased DME, worse.
No retinal detachment or retinal tear noted.
Patient made aware of 24/7 emergency services.
Patient understands condition, prognosis and need for follow up care.
Recommend Eylea intravitreal Injection.
Recommended OBSERVATION.
Recommended artificial tears to use as directed.
Recommended lid scrubs.
Resolved.  ELPIDIO ointment.
Retinal detachment warnings given.
Retinal tear and detachment warning symptoms reviewed and patient instructed to call immediately if increasing floaters, flashes, or decreasing peripheral vision.
Stable.
Start benadryl, F/U tomorrow w/ OD in NP,  If no improvement, start PO steroids.
Symptoms of retinal detachment and endophthalmitis following intravitreal injection discussed; patient advised to call immediately if symptoms ensue.
The IOP is in the target range.
The absence of macula edema findings was communicated to provider.
The level of diabetic retinopathy was communicated to provider.
Will follow up with Dr. Keenan Robledo in the fall.
active, Discussed the importance of blood sugar control in the prevention of ocular complications.
consider repeat repair with Dr Jones if pt desires.
cont. FML BID OU.
monitor.
part of systemic allergic issues. Seeing Allergist at this time.
see DRY AMD for IMP/PLN.
see dme for imp/pln.
use Zaditor BID OU.
There are no Wet Read(s) to document.

## 2022-12-06 NOTE — PROCEDURE NOTE: CLINICAL
PROCEDURE NOTE: Vabysmo Injection Sample OS. Diagnosis: Diabetic Macular Edema. Anesthesia: Topical. Prep: Betadine Drops and Betadine Scrub. Prior to injection, risks/benefits/alternatives discussed including corneal abrasion, infection, loss of vision, hemorrhage, cataract, glaucoma, retinal tears or detachment. A written consent is on file, and the need for today's injection was discussed and the patient is understanding and wishes to proceed. A 30G needle was placed on a Vabysmo 6mg/0.05ml Pre-filled Syringe. Betadine prep was performed. Topical anesthesia was induced with Alcaine. 4% lidocaine pledge. A lid speculum was used. An intravitreal injection of Vabysmo was given. Injection site: 3-4 mm from the limbus. The used syringe/needle was transferred to a biohazard container. Lid speculum removed. Mask worn during procedure. Patient tolerated procedure well. Count fingers vision was verified. There were no complications. Patient was given the standard instruction sheet. Jf Salazar

## 2022-12-06 NOTE — PATIENT DISCUSSION
Recommended [facility] Sample injection today. The injection was given and tolerated well by patient. Post-injection instructions were reviewed and understood by the patient.

## 2023-01-17 NOTE — PATIENT DISCUSSION
Patient has diffuse facial edema and eye lid edema with some itching, maybe an allergic reaction, infection unlikely, may benefit from oral or IV steroids, will have patient follow-up with ER.

## 2023-01-17 NOTE — PATIENT DISCUSSION
Slightly Increased DME, worse.  Will hold on injection today due to facial edema.  Follow up in 2 weeks.

## 2023-01-18 NOTE — PATIENT DISCUSSION
Despite some risk factors, the patient does not demonstrate definitive evidence of glaucoma at this time. no

## 2023-01-30 NOTE — PROCEDURE NOTE: CLINICAL
PROCEDURE NOTE: Eylea Prefilled Syringe 2mg OS. Diagnosis: Diabetic Macular Edema. Prep: Betadine Drops and Betadine Scrub. Prior to injection, risks/benefits/alternatives discussed including corneal abrasion, infection, loss of vision, hemorrhage, cataract, glaucoma, retinal tears or detachment. A written consent is on file, and the need for today's injection was discussed and the patient is understanding and wishes to proceed. A 30G needle was placed on an Eylea 2mg/0.05ml Pre-filled Syringe. Betadine prep was performed. Topical anesthesia was induced with Alcaine. 4% lidocaine pledge. A lid speculum was used. An intravitreal injection of Eylea was given. Injection site: 3-4 mm from the limbus. The used syringe/needle was transferred to a biohazard container. Lid speculum removed. Mask worn during procedure. Patient tolerated procedure well. Count fingers vision was verified. There were no complications. Patient was given the standard instruction sheet. [place] [facility] Doxepin Counseling:  Patient advised that the medication is sedating and not to drive a car after taking this medication. Patient informed of potential adverse effects including but not limited to dry mouth, urinary retention, and blurry vision.  The patient verbalized understanding of the proper use and possible adverse effects of doxepin.  All of the patient's questions and concerns were addressed.

## 2023-01-31 NOTE — PATIENT DISCUSSION
Recommended [facility] Sample injection today. The injection was given and tolerated well by patient. Post-injection instructions were reviewed and understood by the patient.

## 2023-01-31 NOTE — PATIENT DISCUSSION
An examination that was significantly and separately identifiable from the procedure performed today was also completed for Blephartitis.

## 2023-01-31 NOTE — PROCEDURE NOTE: CLINICAL
PROCEDURE NOTE: Vabysmo Injection Sample OS. Diagnosis: Diabetic Macular Edema. Anesthesia: Topical. Prep: Betadine Drops and Betadine Scrub. The patient was identified by the physician. The risks, benefits, alternatives, and possible complications of the procedure were discussed with the patient and informed consent was obtained. The procedure eye was marked with a sticker. The patient was positioned in the chair. A sterile small gauge needle on the medication syringe entered the vitreous cavity 3.0-3.5mm from the limbus and the medication was administered without complication. The speculum was removed. The eye was irrigated with sterile eye rinse solution. The patient was instructed to call immediately for any visual loss, swelling, discharge, or pain after the intravitreal injection. Patient tolerated the procedure well. Vision was checked. Post procedure instructions given. Jonathan Cotton

## 2023-04-05 ENCOUNTER — ESTABLISHED PATIENT (OUTPATIENT)
Dept: URBAN - METROPOLITAN AREA CLINIC 17 | Facility: CLINIC | Age: 68
End: 2023-04-05

## 2023-04-05 DIAGNOSIS — H04.123: ICD-10-CM

## 2023-04-05 DIAGNOSIS — H25.13: ICD-10-CM

## 2023-04-05 DIAGNOSIS — H52.4: ICD-10-CM

## 2023-04-05 PROCEDURE — 99199PERE PHYSICIANS EYECARE PLAN EXAM AND REFRACT EST PT

## 2023-04-05 ASSESSMENT — TONOMETRY
OS_IOP_MMHG: 15
OD_IOP_MMHG: 17

## 2023-04-05 ASSESSMENT — KERATOMETRY
OS_K1POWER_DIOPTERS: 47.00
OD_AXISANGLE_DEGREES: 015
OD_K1POWER_DIOPTERS: 47.00
OS_AXISANGLE_DEGREES: 040
OD_K2POWER_DIOPTERS: 46.75
OS_AXISANGLE2_DEGREES: 130
OD_AXISANGLE2_DEGREES: 105
OS_K2POWER_DIOPTERS: 46.75

## 2023-04-05 ASSESSMENT — VISUAL ACUITY
OS_SC: 20/20-1
OD_SC: 20/30

## 2023-08-23 NOTE — PATIENT DISCUSSION
Symptoms of retinal detachment and endophthalmitis following intravitreal injection discussed; patient advised to call immediately if symptoms ensue. rolling walker

## 2024-02-29 NOTE — PATIENT DISCUSSION
No Fluid, NO Evidence of WET AMD. Patient has Abnormal Magnesium: hypomagnesemia. Will continue to monitor electrolytes closely. Will replace the affected electrolytes and repeat labs to be done after interventions completed. The patient's magnesium results have been reviewed and are listed below.  Recent Labs   Lab 02/28/24  0535   MG 1.7

## 2024-05-08 ENCOUNTER — ESTABLISHED PATIENT (OUTPATIENT)
Dept: URBAN - METROPOLITAN AREA CLINIC 17 | Facility: CLINIC | Age: 69
End: 2024-05-08

## 2024-05-08 DIAGNOSIS — H25.13: ICD-10-CM

## 2024-05-08 DIAGNOSIS — H52.4: ICD-10-CM

## 2024-05-08 DIAGNOSIS — H04.123: ICD-10-CM

## 2024-05-08 PROCEDURE — 92015 DETERMINE REFRACTIVE STATE: CPT

## 2024-05-08 PROCEDURE — 99214 OFFICE O/P EST MOD 30 MIN: CPT

## 2024-05-08 ASSESSMENT — KERATOMETRY
OS_K1POWER_DIOPTERS: 47.00
OD_K1POWER_DIOPTERS: 47.00
OS_AXISANGLE_DEGREES: 040
OD_AXISANGLE_DEGREES: 015
OS_K2POWER_DIOPTERS: 46.75
OS_AXISANGLE2_DEGREES: 130
OD_K2POWER_DIOPTERS: 46.75
OD_AXISANGLE2_DEGREES: 105

## 2024-05-08 ASSESSMENT — VISUAL ACUITY
OD_SC: 20/20-1
OS_SC: 20/25-1

## 2024-05-08 ASSESSMENT — TONOMETRY
OD_IOP_MMHG: 17
OS_IOP_MMHG: 15

## 2025-03-27 ENCOUNTER — CONSULTATION/EVALUATION (OUTPATIENT)
Age: 70
End: 2025-03-27

## 2025-03-27 DIAGNOSIS — H25.13: ICD-10-CM

## 2025-03-27 PROCEDURE — 92014 COMPRE OPH EXAM EST PT 1/>: CPT
